# Patient Record
Sex: MALE | Race: WHITE | NOT HISPANIC OR LATINO
[De-identification: names, ages, dates, MRNs, and addresses within clinical notes are randomized per-mention and may not be internally consistent; named-entity substitution may affect disease eponyms.]

---

## 2023-04-22 ENCOUNTER — APPOINTMENT (OUTPATIENT)
Dept: ORTHOPEDIC SURGERY | Facility: CLINIC | Age: 88
End: 2023-04-22
Payer: MEDICARE

## 2023-04-22 VITALS — BODY MASS INDEX: 27.32 KG/M2 | HEIGHT: 65 IN | WEIGHT: 164 LBS

## 2023-04-22 DIAGNOSIS — S52.572A OTHER INTRAARTICULAR FRACTURE OF LOWER END OF LEFT RADIUS, INITIAL ENCOUNTER FOR CLOSED FRACTURE: ICD-10-CM

## 2023-04-22 PROCEDURE — 99203 OFFICE O/P NEW LOW 30 MIN: CPT

## 2023-04-22 NOTE — HISTORY OF PRESENT ILLNESS
[9] : 9 [Dull/Aching] : dull/aching [Localized] : localized [Sharp] : sharp [Retired] : Work status: retired [] : Post Surgical Visit: no [FreeTextEntry1] : Lt hand [FreeTextEntry3] : 4/20/23 [FreeTextEntry5] : Patient was pulling out weeds in his yard, pt fell down on his hand on 4/20/23\par Pt brought x rays from Urgent Care [de-identified] : movement [de-identified] : x rays

## 2023-04-22 NOTE — REASON FOR VISIT
[FreeTextEntry2] : This is an 87 year old RHD M with a left wrist fracture after a fall on 4/20/23.  He was feeling fine, but had more pain today and was seen at [facility] and told there was a fracture.  In a metal volar splint.

## 2023-04-22 NOTE — DATA REVIEWED
[FreeTextEntry1] : XRs 4/22/23: minimally displaced L distal radius fracture with intra-articular component

## 2023-04-22 NOTE — ASSESSMENT
[FreeTextEntry1] : We reviewed the findings and the history.\par Questions were answered and concerned addressed.\par The options were outlined.\par Given the significant swelling, a brace was applied instead of a SAC.\par Proper elevation outlined.\par Ice pack applied and secured with ACE Wrap.\par He received analgesics from  today.\par Will see Dr. Mora in follow up for possible cast transition/repeat XRs.\par \par Progress note completed by Mary Jane CADET.\par Patient seen by Mary Jane CADET.

## 2023-04-24 ENCOUNTER — APPOINTMENT (OUTPATIENT)
Dept: ORTHOPEDIC SURGERY | Facility: CLINIC | Age: 88
End: 2023-04-24
Payer: MEDICARE

## 2023-04-24 VITALS — WEIGHT: 164 LBS | BODY MASS INDEX: 27.32 KG/M2 | HEIGHT: 65 IN

## 2023-04-24 DIAGNOSIS — S60.212A CONTUSION OF LEFT WRIST, INITIAL ENCOUNTER: ICD-10-CM

## 2023-04-24 PROCEDURE — 99213 OFFICE O/P EST LOW 20 MIN: CPT

## 2023-04-24 NOTE — HISTORY OF PRESENT ILLNESS
[Sudden] : sudden [7] : 7 [Ice] : ice [de-identified] : L wrist pain and swelling Thursday\par \par Has HD L side [] : no [FreeTextEntry1] : L wrist [FreeTextEntry3] : 4/20/23 [FreeTextEntry5] : he fall and braced his fall with his wrist [FreeTextEntry9] : brace [de-identified] : activity [de-identified] : 4/22 carol dickey [de-identified] : xr